# Patient Record
Sex: MALE | URBAN - METROPOLITAN AREA
[De-identification: names, ages, dates, MRNs, and addresses within clinical notes are randomized per-mention and may not be internally consistent; named-entity substitution may affect disease eponyms.]

---

## 2019-11-25 ENCOUNTER — OFFICE VISIT (OUTPATIENT)
Dept: URBAN - METROPOLITAN AREA CLINIC 22 | Facility: CLINIC | Age: 59
End: 2019-11-25
Payer: COMMERCIAL

## 2019-11-25 DIAGNOSIS — E11.9 TYPE 2 DIABETES MELLITUS W/O COMPLICATION: Primary | ICD-10-CM

## 2019-11-25 PROCEDURE — 92014 COMPRE OPH EXAM EST PT 1/>: CPT | Performed by: OPTOMETRIST

## 2019-11-25 ASSESSMENT — VISUAL ACUITY
OD: 20/20
OS: 20/20

## 2019-11-25 ASSESSMENT — INTRAOCULAR PRESSURE
OD: 20
OS: 20

## 2019-11-25 NOTE — IMPRESSION/PLAN
Impression: Type 2 diabetes mellitus w/o complication: D13.7. OU. Plan: Recommend yearly diabetic eye exam. Discussed with patient importance of good blood sugar control with regular visits with PCP, compliance with medications, healthy diet and daily exercise.

## 2020-09-15 ENCOUNTER — OFFICE VISIT (OUTPATIENT)
Dept: URBAN - METROPOLITAN AREA CLINIC 22 | Facility: CLINIC | Age: 60
End: 2020-09-15
Payer: COMMERCIAL

## 2020-09-15 DIAGNOSIS — H52.4 PRESBYOPIA: ICD-10-CM

## 2020-09-15 DIAGNOSIS — H25.812 COMBINED FORMS OF AGE-RELATED CATARACT, LEFT EYE: ICD-10-CM

## 2020-09-15 DIAGNOSIS — H10.45 OTHER CHRONIC ALLERGIC CONJUNCTIVITIS: ICD-10-CM

## 2020-09-15 PROCEDURE — 99214 OFFICE O/P EST MOD 30 MIN: CPT | Performed by: OPTOMETRIST

## 2020-09-15 ASSESSMENT — INTRAOCULAR PRESSURE
OD: 17
OS: 18

## 2020-09-15 ASSESSMENT — VISUAL ACUITY
OS: 20/20
OD: 20/20

## 2020-09-15 NOTE — IMPRESSION/PLAN
Impression: Other chronic allergic conjunctivitis: H10.45 Bilateral. Plan: Recommended Pataday Eyedrops

## 2020-09-15 NOTE — IMPRESSION/PLAN
Impression: Combined forms of age-related cataract, left eye: H25.812. Left. Plan: referred for sx as necessary.  If any changes to vision RTC

## 2020-09-15 NOTE — IMPRESSION/PLAN
Impression: Type 2 diabetes mellitus w/o complication: T64.3 OU. Plan: RTC 1 year for [address]. Recommend continued BS monitoring and scheduled visits with PCP/ENDO.

## 2021-08-24 ENCOUNTER — OFFICE VISIT (OUTPATIENT)
Dept: URBAN - METROPOLITAN AREA CLINIC 23 | Facility: CLINIC | Age: 61
End: 2021-08-24
Payer: COMMERCIAL

## 2021-08-24 PROCEDURE — 99204 OFFICE O/P NEW MOD 45 MIN: CPT | Performed by: OPHTHALMOLOGY

## 2021-08-24 ASSESSMENT — INTRAOCULAR PRESSURE
OD: 15
OS: 17

## 2021-08-24 ASSESSMENT — VISUAL ACUITY: OS: 20/30

## 2021-08-24 ASSESSMENT — KERATOMETRY
OS: 44.00
OD: 44.00

## 2021-08-24 NOTE — IMPRESSION/PLAN
Impression: Type 2 diabetes mellitus w/o complication: K20.2 OU. Condition: established, stable. Plan: Discussed diagnosis in detail with patient. Advised patient of condition. Discussed treatment options with patient. No treatment is required at this time. Will continue to observe condition and or symptoms. Emphasized blood sugar control.

## 2021-08-24 NOTE — IMPRESSION/PLAN
Impression: Other chronic allergic conjunctivitis: H10.45. Plan: Discussed diagnosis in detail with patient. Advised patient of condition. Recommend using Zaditor BID if he is having symptoms or QD is not along with artificial tears daily and frequently.

## 2021-08-24 NOTE — IMPRESSION/PLAN
Impression: Combined forms of age-related cataract, left eye: H25.812. Condition: established, worsening. Symptoms: could improve with surgery. Plan: Cataract accounts for patient's complaints. Reviewed risks, benefits, and procedure. Patient desires surgery, schedule ce/iol OS, RL2, standard lens, distance refractive target, patient is clear for surgery in [facility], [place] [address].

## 2021-08-26 ENCOUNTER — TESTING ONLY (OUTPATIENT)
Dept: URBAN - METROPOLITAN AREA CLINIC 23 | Facility: CLINIC | Age: 61
End: 2021-08-26
Payer: COMMERCIAL

## 2021-08-26 ASSESSMENT — PACHYMETRY
OD: 4.64
OS: 24.95
OD: 25.19
OS: 3.65

## 2021-09-09 ENCOUNTER — SURGERY (OUTPATIENT)
Dept: URBAN - METROPOLITAN AREA SURGERY 7 | Facility: SURGERY | Age: 61
End: 2021-09-09
Payer: COMMERCIAL

## 2021-09-09 PROCEDURE — 66984 XCAPSL CTRC RMVL W/O ECP: CPT | Performed by: OPHTHALMOLOGY

## 2021-09-10 ENCOUNTER — POST-OPERATIVE VISIT (OUTPATIENT)
Dept: URBAN - METROPOLITAN AREA CLINIC 22 | Facility: CLINIC | Age: 61
End: 2021-09-10
Payer: COMMERCIAL

## 2021-09-10 DIAGNOSIS — Z96.1 PRESENCE OF INTRAOCULAR LENS: Primary | ICD-10-CM

## 2021-09-10 PROCEDURE — 99024 POSTOP FOLLOW-UP VISIT: CPT | Performed by: STUDENT IN AN ORGANIZED HEALTH CARE EDUCATION/TRAINING PROGRAM

## 2021-09-10 ASSESSMENT — INTRAOCULAR PRESSURE
OS: 18
OD: 15

## 2021-09-10 NOTE — IMPRESSION/PLAN
Impression: S/P Cataract Extraction by phacoemulsification with IOL placement [id number] OS - 1 Day. Presence of intraocular lens  Z96.1. Post operative instructions reviewed - Plan: Recommend using artificial tears such as Systane BID-QID OU. RTC immediately if any sudden changes to vision or pain. --Advised patient to use artificial tears for comfort.

## 2021-09-17 ENCOUNTER — POST-OPERATIVE VISIT (OUTPATIENT)
Dept: URBAN - METROPOLITAN AREA CLINIC 22 | Facility: CLINIC | Age: 61
End: 2021-09-17
Payer: COMMERCIAL

## 2021-09-17 PROCEDURE — 99024 POSTOP FOLLOW-UP VISIT: CPT | Performed by: STUDENT IN AN ORGANIZED HEALTH CARE EDUCATION/TRAINING PROGRAM

## 2021-09-17 ASSESSMENT — VISUAL ACUITY
OS: 20/20
OD: 20/20

## 2021-09-17 ASSESSMENT — INTRAOCULAR PRESSURE
OD: 16
OS: 17

## 2021-09-17 NOTE — IMPRESSION/PLAN
Impression: S/P Cataract Extraction by phacoemulsification with IOL placement [id number] OS - 8 Days. Presence of intraocular lens  Z96.1. Post operative instructions reviewed - Plan: Continue Systane QID OU. Pt states allergies irritating eyes today. Can use OTC allergy drops such as Zaditor/Pataday. RTC immediately if any sudden changes to vision or pain.

## 2021-10-08 ENCOUNTER — OFFICE VISIT (OUTPATIENT)
Dept: URBAN - METROPOLITAN AREA CLINIC 22 | Facility: CLINIC | Age: 61
End: 2021-10-08
Payer: COMMERCIAL

## 2021-10-08 PROCEDURE — 92014 COMPRE OPH EXAM EST PT 1/>: CPT | Performed by: STUDENT IN AN ORGANIZED HEALTH CARE EDUCATION/TRAINING PROGRAM

## 2021-10-08 ASSESSMENT — VISUAL ACUITY
OS: 20/20
OD: 20/20

## 2021-10-08 ASSESSMENT — INTRAOCULAR PRESSURE
OS: 17
OD: 15

## 2021-10-08 NOTE — IMPRESSION/PLAN
Impression: Dry eye syndrome of bilateral lacrimal glands: H04.123.  Plan: Increase tears to QID OU, rec'd PF.

## 2022-04-06 ENCOUNTER — OFFICE VISIT (OUTPATIENT)
Dept: URBAN - METROPOLITAN AREA CLINIC 22 | Facility: CLINIC | Age: 62
End: 2022-04-06
Payer: COMMERCIAL

## 2022-04-06 DIAGNOSIS — H04.123 DRY EYE SYNDROME OF BILATERAL LACRIMAL GLANDS: ICD-10-CM

## 2022-04-06 PROCEDURE — 92014 COMPRE OPH EXAM EST PT 1/>: CPT | Performed by: STUDENT IN AN ORGANIZED HEALTH CARE EDUCATION/TRAINING PROGRAM

## 2022-04-06 ASSESSMENT — VISUAL ACUITY
OS: 20/20
OD: 20/20

## 2022-04-06 ASSESSMENT — INTRAOCULAR PRESSURE
OD: 15
OS: 13

## 2022-04-06 NOTE — IMPRESSION/PLAN
Impression: Dry eye syndrome of bilateral lacrimal glands: H04.123. Plan: Continue AT QID OU. Can consider Eysuvis/Restasis/Xiidra if needed.

## 2023-05-23 ENCOUNTER — OFFICE VISIT (OUTPATIENT)
Dept: URBAN - METROPOLITAN AREA CLINIC 22 | Facility: CLINIC | Age: 63
End: 2023-05-23
Payer: COMMERCIAL

## 2023-05-23 DIAGNOSIS — H26.493 OTHER SECONDARY CATARACT, BILATERAL: ICD-10-CM

## 2023-05-23 DIAGNOSIS — Z79.84 LONG TERM CURRENT USE OF ORAL HYPOGLYCEMIC DRUG: ICD-10-CM

## 2023-05-23 DIAGNOSIS — E11.9 TYPE 2 DIABETES MELLITUS W/O COMPLICATION: ICD-10-CM

## 2023-05-23 DIAGNOSIS — H04.123 DRY EYE SYNDROME OF BILATERAL LACRIMAL GLANDS: Primary | ICD-10-CM

## 2023-05-23 PROCEDURE — 99214 OFFICE O/P EST MOD 30 MIN: CPT | Performed by: STUDENT IN AN ORGANIZED HEALTH CARE EDUCATION/TRAINING PROGRAM

## 2023-05-23 PROCEDURE — 92134 CPTRZ OPH DX IMG PST SGM RTA: CPT | Performed by: STUDENT IN AN ORGANIZED HEALTH CARE EDUCATION/TRAINING PROGRAM

## 2023-05-23 ASSESSMENT — KERATOMETRY
OD: 44.00
OS: 42.63

## 2023-05-23 ASSESSMENT — INTRAOCULAR PRESSURE
OS: 18
OD: 14

## 2023-05-23 ASSESSMENT — VISUAL ACUITY
OD: 20/20
OS: 20/60

## 2023-05-23 NOTE — IMPRESSION/PLAN
Impression: Type 2 diabetes mellitus w/o complication: C56.9. Plan: Discussed findings, no retinopathy or macular edema OU. Patient educated on importance of well-controlled blood sugar/pressure, risk of vision loss from diabetic retinopathy, and dilated eye exams. Continue management with PCP.

## 2023-05-23 NOTE — IMPRESSION/PLAN
Impression: Other secondary cataract, bilateral: H26.493. Plan: Discussed findings and educated patient on condition. Not visually significant, YAG not indicated at this time.  RTC in 1 year for [facility].

## 2023-05-23 NOTE — IMPRESSION/PLAN
Impression: Dry eye syndrome of bilateral lacrimal glands: H04.123. Plan: Discussed findings and educated patient on condition. Inadequate relief with OTC artificial tears. Rx Eysuvis TID OU x 2 weeks (sample given). Discussed potential side effects. Continue artificial tears QID OU, warm compresses BID OU, lid scrubs BID OU in conjunction with medication. Deferred glasses Rx today; fluctuating VA, BCVA 20/25 OS. Re-check at next visit after ocular surface improves.

## 2024-06-06 ENCOUNTER — OFFICE VISIT (OUTPATIENT)
Dept: URBAN - METROPOLITAN AREA CLINIC 26 | Facility: CLINIC | Age: 64
End: 2024-06-06
Payer: COMMERCIAL

## 2024-06-06 DIAGNOSIS — E11.9 TYPE 2 DIABETES MELLITUS W/O COMPLICATION: ICD-10-CM

## 2024-06-06 DIAGNOSIS — H52.4 PRESBYOPIA: Primary | ICD-10-CM

## 2024-06-06 DIAGNOSIS — H43.812 VITREOUS DEGENERATION, LEFT EYE: ICD-10-CM

## 2024-06-06 DIAGNOSIS — H04.123 DRY EYE SYNDROME OF BILATERAL LACRIMAL GLANDS: ICD-10-CM

## 2024-06-06 PROCEDURE — 92004 COMPRE OPH EXAM NEW PT 1/>: CPT | Performed by: OPTOMETRIST

## 2024-06-06 RX ORDER — LIFITEGRAST 50 MG/ML
5 % SOLUTION/ DROPS OPHTHALMIC
Qty: 60 | Refills: 11 | Status: ACTIVE
Start: 2024-06-06

## 2024-06-06 ASSESSMENT — INTRAOCULAR PRESSURE
OD: 20
OS: 17

## 2024-06-06 ASSESSMENT — KERATOMETRY
OD: 43.50
OS: 43.50

## 2024-06-06 ASSESSMENT — VISUAL ACUITY
OD: 20/20
OS: 20/20